# Patient Record
(demographics unavailable — no encounter records)

---

## 2024-11-04 NOTE — ASSESSMENT
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 Episodes of palpitations Increased COLLINS.  Plan: Fasting blood work ordered. Exercise stress test. Holter monitor for 7 days.  F/u after the tests.  Robin Galo MD

## 2024-11-04 NOTE — REVIEW OF SYSTEMS
[SOB] : shortness of breath [Dyspnea on exertion] : dyspnea during exertion [Chest Discomfort] : chest discomfort [Palpitations] : palpitations [Negative] : Gastrointestinal [Lower Ext Edema] : no extremity edema [Syncope] : no syncope [Dizziness] : dizziness [Anxiety] : anxiety

## 2024-11-04 NOTE — HISTORY OF PRESENT ILLNESS
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 Reports occasional episodes of left sided chest pain over the last 6 months. Reports feeling like she is being shocked in her chest. Reports palpitations. Reports worsening COLLINS with stairs. Pt currently living in [place] to help take care of sick uncle. No recent hospitalizations.   6/6/24

## 2024-12-29 NOTE — CARDIOLOGY SUMMARY
[de-identified] : (12/17/2024) ECG: sinus rhythm at 51 bpm, no significant ST/T abnormalities [de-identified] : (11/4/2024) Holter: NSVT x2: longest duration 8 beats; fastest 185 bpm; PVC 0.13%. pause 2.7 second. [de-identified] : (11/5/2024) EST: 1. The patient underwent stress testing using the standard Farshad protocol. _ The patient exercised for 9 min 0 sec. _ The test was stopped due to fatigue and shortness of breath. _ The peak heart rate was 138 bpm; 83 % of predicted maximal heart rate for this patient. _ The patient achieved 10.3 METS which is consistent with good exercise capacity. 2. Chest pain during test: No chest pain. 3. Did not achieve target heart rate. 4. Normal blood pressure response. 5. Arrhythmias: PACs, PVCs during recovery. 6. Baseline electrocardiogram: normal sinus rhythm at a rate of 78 bpm with no sigificant ST abnomalities. 7. Stress electrocardiogram: No significant ischemic ST segment changes. 8. The ECG is negative for ischemia at heart rate achieved. [de-identified] : (12/18/2024) TTE: 1. Left ventricular cavity is normal in size. Left ventricular systolic function is normal with an ejection fraction of 58 % by Zeng's method of disks. There are no regional wall motion abnormalities seen. 2. Normal left ventricular diastolic function. 3. Normal right ventricular cavity size, with normal wall thickness, and normal right ventricular systolic function. 4. Normal left and right atrial size. 5. 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 6. Prolapse of the anterior mitral leaflet. 7. Mild mitral regurgitation. 8. No pericardial effusion seen.

## 2024-12-29 NOTE — HISTORY OF PRESENT ILLNESS
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 NSVT  intermittent palpitations every few days, reported as moderate to severe intensity had syncope early December 2024, no preceding symptoms.  She has no chest pain, no shortness of breath, no dyspnea on exertion, no orthopnea, no PND. She denies dizziness, lightheadedness. She has no exertional symptoms. She presents for evaluation.

## 2024-12-29 NOTE — DISCUSSION/SUMMARY
[FreeTextEntry1] : Ms. Kim Miner is a pleasant 54-year-old woman with SVT s/p Ablation AVNRT (10/2010), Multiple Sclerosis, anxiety, PTSD, mitral valve prolapse. NSVT  Patient reports intermittent palpitations every few days, reported as moderate to severe intensity. She also had syncope early December 2024, no preceding symptoms.  I am not able to start BB due to resting bradycardia.  I recommend CMR to evaluate for Mitral Annular disjunction.   I recommend evaluation for TAURUS.   I recommend ILR to assess for arrhythmias as etiology of syncope. I recommend an ILR implant for this patient for long term detection of arrhythmias as a cause to her symptoms. I discussed an ILR implantation with the patient in great detail. I discussed benefits such as monitoring the heart rate and rhythm for a prolonged period of time which could identify causes of her symptoms and assist in establishing a diagnosis for future management and treatment. In addition, ILR implantation procedure as well as risks such as infection, bleeding and sensitivity to cardiac monitor material was discussed. Ample time was provided for questions/answers. Patient has expressed understanding and agreement to proceed with ILR implant. Patient will be notified by my  to confirm scheduling date . I discussed remote monitoring and follow up device interrogations as well.  I discussed with patient plan of care in great details. I answered all her questions to her satisfaction. Patient was pleased with the visit.  Patient will follow with me in 2 months time. Please do not hesitate to contact me at [phone] if you have any further questions regarding this patient care.  [EKG obtained to assist in diagnosis and management of assessed problem(s)] : EKG obtained to assist in diagnosis and management of assessed problem(s)

## 2025-02-17 NOTE — REASON FOR VISIT
[Arrhythmia/ECG Abnorrmalities] : arrhythmia/ECG abnormalities [FreeTextEntry3] : Dr. Robin Galo - Dr. Staci Garvin

## 2025-02-17 NOTE — DISCUSSION/SUMMARY
[FreeTextEntry1] : Ms. 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  I am not able to start BB due to resting bradycardia.  I reviewed result of CMR and educated patient on Mitral Annular disjunction.   I recommend evaluation for TAURUS.   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 Based on EHRA expert consensus statement on arrhythmic mitral valve prolapse and mitral annular disjunction complex published in Europ[employer] (2022) 24, 4769-3320, and endorsed by the Heart Rhythm Society, I recommend implant of dual chamber Defibrillator for the prevention of sudden cardiac death.  I will aim for dual chamber to allow atrial pacing and use of Betablockers to treat NSVT.  I am recommending a defibrillator implant for the primary prevention of sudden cardiac death. A thorough discussion was held with the patient concerning all aspects of ICD therapy. We reviewed the data supporting ICD therapy and how it applies individually. We discussed the risks, nature of procedure, and follow up care after device is implanted, including outcomes of ICD implantation and living with an ICD. We discussed management of ICD therapy throughout life, including deactivation of the ICD. Patient is in agreement with proceeding with the device implant. We discussed the risks of bleeding, hematoma, injury to vessels and heart, perforation, tamponade, pneumothorax, infection, lead dislodgment, device malfunction, and rare risks of stroke/heart attack/death. Patient expressed understanding of the discussion. After all questions were answered, it was a shared decision to proceed with ICD therapy. My  will contact patient with date and instruction prior to the procedure.  Dual, [facility] [employer], 3/6/2025.  Patient will follow with me in 4-6 weeks' time or earlier if symptoms develop worsen. Please do not hesitate to contact me at [phone] if you have any further questions regarding this patient care..

## 2025-02-17 NOTE — HISTORY OF PRESENT ILLNESS
[Home] : at home, [unfilled] , at the time of the visit. [Medical Office: ([facility])___] : at the medical office located in  [Telehealth (audio & video)] : This visit was provided via telehealth using real-time 2-way audio visual technology. 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 NSVT, unexplained syncope, mitral annular disjunction  intermittent palpitations every few days, reported as moderate to severe intensity had syncope early December 2024, no preceding symptoms, fell between cabinet and wall. injured her elbow.  2/11/2025: no recurrent syncope. CMR showed mitral annular disjunction. She has no chest pain, no shortness of breath, no dyspnea on exertion, no orthopnea, no PND. She denies dizziness, lightheadedness. She has no exertional symptoms. She presents for evaluation.

## 2025-02-17 NOTE — CARDIOLOGY SUMMARY
[de-identified] : (12/17/2024) ECG: sinus rhythm at 51 bpm, no significant ST/T abnormalities [de-identified] : (11/4/2024) Holter: NSVT x2: longest duration 8 beats; fastest 185 bpm; PVC 0.13%. pause 2.7 second. [de-identified] : (11/5/2024) EST: 1. The patient underwent stress testing using the standard Farshad protocol. _ The patient exercised for 9 min 0 sec. _ The test was stopped due to fatigue and shortness of breath. _ The peak heart rate was 138 bpm; 83 % of predicted maximal heart rate for this patient. _ The patient achieved 10.3 METS which is consistent with good exercise capacity. 2. Chest pain during test: No chest pain. 3. Did not achieve target heart rate. 4. Normal blood pressure response. 5. Arrhythmias: PACs, PVCs during recovery. 6. Baseline electrocardiogram: normal sinus rhythm at a rate of 78 bpm with no sigificant ST abnomalities. 7. Stress electrocardiogram: No significant ischemic ST segment changes. 8. The ECG is negative for ischemia at heart rate achieved. [de-identified] : (1/30/2025) CMR: 1. Normal left ventricular size. Normal left ventricular wall thickness. Normal left ventricular systolic function (LVEF 61%). No regional wall motion abnormalities. 2. All left ventricular walls are fully viable and without myocardial scar/infarction. 3. Normal right ventricular size. Normal right ventricular systolic function (RVEF 51%). 4. Normal left atrial size. Mild mitral regurgitation. Mitral valve prolapse (bi-leaflet involvement). Mitral annular disjunction (maximal diameter 3 mm as measured in the four chamber orientation). [de-identified] : (12/18/2024) TTE: 1. Left ventricular cavity is normal in size. Left ventricular systolic function is normal with an ejection fraction of 58 % by Zeng's method of disks. There are no regional wall motion abnormalities seen. 2. Normal left ventricular diastolic function. 3. Normal right ventricular cavity size, with normal wall thickness, and normal right ventricular systolic function. 4. Normal left and right atrial size. 5. 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 6. Prolapse of the anterior mitral leaflet. 7. Mild mitral regurgitation. 8. No pericardial effusion seen.

## 2025-02-24 NOTE — CARDIOLOGY SUMMARY
[de-identified] : (2/24/2025) ECG: sinus rhythm at 59 bpm, no significant ST/T abnormalities (12/17/2024) ECG: sinus rhythm at 51 bpm, no significant ST/T abnormalities [de-identified] : (1/17/2025) MCOT 30 days. No Ventricular Tachycardia - PVC <1% (11/4/2024) Holter: NSVT x2: longest duration 8 beats; fastest 185 bpm; PVC 0.13%. pause 2.7 second. [de-identified] : (11/5/2024) EST: 1. The patient underwent stress testing using the standard Farshad protocol. _ The patient exercised for 9 min 0 sec. _ The test was stopped due to fatigue and shortness of breath. _ The peak heart rate was 138 bpm; 83 % of predicted maximal heart rate for this patient. _ The patient achieved 10.3 METS which is consistent with good exercise capacity. 2. Chest pain during test: No chest pain. 3. Did not achieve target heart rate. 4. Normal blood pressure response. 5. Arrhythmias: PACs, PVCs during recovery. 6. Baseline electrocardiogram: normal sinus rhythm at a rate of 78 bpm with no sigificant ST abnomalities. 7. Stress electrocardiogram: No significant ischemic ST segment changes. 8. The ECG is negative for ischemia at heart rate achieved. [de-identified] : (12/18/2024) TTE: 1. Left ventricular cavity is normal in size. Left ventricular systolic function is normal with an ejection fraction of 58 % by Zeng's method of disks. There are no regional wall motion abnormalities seen. 2. Normal left ventricular diastolic function. 3. Normal right ventricular cavity size, with normal wall thickness, and normal right ventricular systolic function. 4. Normal left and right atrial size. 5. 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 6. Prolapse of the anterior mitral leaflet. 7. Mild mitral regurgitation. 8. No pericardial effusion seen. [de-identified] : (1/30/2025) CMR: 1. Normal left ventricular size. Normal left ventricular wall thickness. Normal left ventricular systolic function (LVEF 61%). No regional wall motion abnormalities. 2. All left ventricular walls are fully viable and without myocardial scar/infarction. 3. Normal right ventricular size. Normal right ventricular systolic function (RVEF 51%). 4. Normal left atrial size. Mild mitral regurgitation. Mitral valve prolapse (bi-leaflet involvement). Mitral annular disjunction (maximal diameter 3 mm as measured in the four chamber orientation).

## 2025-02-24 NOTE — DISCUSSION/SUMMARY
[FreeTextEntry1] : Ms. 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 She had 8 days Holter in Nov 2024 and 30 days MCOT in Jan 2025  I am not able to start BB due to resting bradycardia.  I reviewed result of CMR and educated patient on Mitral Annular disjunction.   I recommend evaluation for TAURUS.   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 We discussed defibrillator implant at length. Patient is not interested in defibrillator at this time. The next best option is loop recorder implant to monitor for ventricular arrhythmias as cause of syncope.  Based on EHRA expert consensus statement on arrhythmic mitral valve prolapse and mitral annular disjunction complex published in Europ[employer] (2022) 24, [id number], and endorsed by the Heart Rhythm Society, I recommend implant of Loop recorder.   I recommend an ILR implant for this patient for long term detection of arrhythmias as a cause to her symptoms. I discussed an ILR implantation with the patient in great detail. I discussed benefits such as monitoring the heart rate and rhythm for a prolonged period of time which could identify causes of her symptoms and assist in establishing a diagnosis for future management and treatment. In addition, ILR implantation procedure as well as risks such as infection, bleeding and sensitivity to cardiac monitor material was discussed. Ample time was provided for questions/answers. Patient has expressed understanding and agreement to proceed with ILR implant. Patient will be notified by my  to confirm scheduling date . I discussed remote monitoring and follow up device interrogations as well.  I discussed with patient plan of care in great details. I answered all her questions to her satisfaction. Patient was pleased with the visit.  Patient will follow with me in 2 months time. Please do not hesitate to contact me at [phone] if you have any further questions regarding this patient care.  [EKG obtained to assist in diagnosis and management of assessed problem(s)] : EKG obtained to assist in diagnosis and management of assessed problem(s)

## 2025-06-09 NOTE — HISTORY OF PRESENT ILLNESS
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 NSVT, unexplained syncope, mitral annular disjunction  intermittent palpitations every few days, reported as moderate to severe intensity had syncope early December 2024, no preceding symptoms, fell between cabinet and wall. injured her elbow.  2/11/2025: no recurrent syncope. CMR showed mitral annular disjunction.   2/24/2025: no symptoms. no recurrent syncope. She does not defibrillator implant. She has no chest pain, no shortness of breath, no dyspnea on exertion, no orthopnea, no PND. She denies dizziness, lightheadedness. She has no exertional symptoms. She presents for evaluation.  S/p Loop implant for unknown cause of syncope on 4/29/2025

## 2025-06-09 NOTE — DISCUSSION/SUMMARY
[FreeTextEntry1] : Ms. 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 She had 8 days Holter in Nov 2024 and 30 days MCOT in Jan 2025  I am not able to start BB due to resting bradycardia.  I reviewed result of CMR and educated patient on Mitral Annular disjunction.   I recommend evaluation for TAURUS.   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 We discussed defibrillator implant at length. Patient is not interested in defibrillator at this time. The next best option is loop recorder implant to monitor for ventricular arrhythmias as cause of syncope.  Based on EHRA expert consensus statement on arrhythmic mitral valve prolapse and mitral annular disjunction complex published in Europace (2022) 24, 7754-6123, and endorsed by the Heart Rhythm Society, I recommend implant of Loop recorder.   I recommend an ILR implant for this patient for long term detection of arrhythmias as a cause to her symptoms. I discussed an ILR implantation with the patient in great detail. I discussed benefits such as monitoring the heart rate and rhythm for a prolonged period of time which could identify causes of her symptoms and assist in establishing a diagnosis for future management and treatment. In addition, ILR implantation procedure as well as risks such as infection, bleeding and sensitivity to cardiac monitor material was discussed. Ample time was provided for questions/answers. Patient has expressed understanding and agreement to proceed with ILR implant.  ---------------------------------- 6/9/2025 Presents for  wound check and device interrogation # Wound check - incision site has healed  but with remnant suture that is sticking out and was irritating her skin. Taken out using suture removal kit. # Loop was interrogated - report she had some palpitations and chest pains while watching her grands baseball game. Nothing found on interrogation. Tachy rate decreased to 162 from 176. I discussed parameters. process of remote monitoring. Emphasized that loop can't detect heart attack. I also mention possible co- pay  She is not on any meds. #  Current cigarette smoker - she said is slowly cutting back from 1 PPD now to 5 sticks now. RTO in 9-12 months I have also advised the patient to go to the nearest emergency room if he experiences any chest pain, dyspnea, syncope, or has any other compelling symptoms.

## 2025-06-09 NOTE — CARDIOLOGY SUMMARY
[de-identified] : (2/24/2025) ECG: sinus rhythm at 59 bpm, no significant ST/T abnormalities (12/17/2024) ECG: sinus rhythm at 51 bpm, no significant ST/T abnormalities [de-identified] : (1/17/2025) MCOT 30 days. No Ventricular Tachycardia - PVC <1% (11/4/2024) Holter: NSVT x2: longest duration 8 beats; fastest 185 bpm; PVC 0.13%. pause 2.7 second. [de-identified] : (11/5/2024) EST: 1. The patient underwent stress testing using the standard Farshad protocol. _ The patient exercised for 9 min 0 sec. _ The test was stopped due to fatigue and shortness of breath. _ The peak heart rate was 138 bpm; 83 % of predicted maximal heart rate for this patient. _ The patient achieved 10.3 METS which is consistent with good exercise capacity. 2. Chest pain during test: No chest pain. 3. Did not achieve target heart rate. 4. Normal blood pressure response. 5. Arrhythmias: PACs, PVCs during recovery. 6. Baseline electrocardiogram: normal sinus rhythm at a rate of 78 bpm with no sigificant ST abnomalities. 7. Stress electrocardiogram: No significant ischemic ST segment changes. 8. The ECG is negative for ischemia at heart rate achieved. [de-identified] : (12/18/2024) TTE: 1. Left ventricular cavity is normal in size. Left ventricular systolic function is normal with an ejection fraction of 58 % by Zeng's method of disks. There are no regional wall motion abnormalities seen. 2. Normal left ventricular diastolic function. 3. Normal right ventricular cavity size, with normal wall thickness, and normal right ventricular systolic function. 4. Normal left and right atrial size. 5. 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 6. Prolapse of the anterior mitral leaflet. 7. Mild mitral regurgitation. 8. No pericardial effusion seen. [de-identified] : (1/30/2025) CMR: 1. Normal left ventricular size. Normal left ventricular wall thickness. Normal left ventricular systolic function (LVEF 61%). No regional wall motion abnormalities. 2. All left ventricular walls are fully viable and without myocardial scar/infarction. 3. Normal right ventricular size. Normal right ventricular systolic function (RVEF 51%). 4. Normal left atrial size. Mild mitral regurgitation. Mitral valve prolapse (bi-leaflet involvement). Mitral annular disjunction (maximal diameter 3 mm as measured in the four chamber orientation).

## 2025-06-09 NOTE — PHYSICAL EXAM
[Well Developed] : well developed [Well Nourished] : well nourished [No Acute Distress] : no acute distress [Normal] : normal conjunctiva [Normal Conjunctiva] : normal conjunctiva [Normal Venous Pressure] : normal venous pressure [No Carotid Bruit] : no carotid bruit [Normal S1, S2] : normal S1, S2 [No Rub] : no rub [No Murmur] : no murmur [No Gallop] : no gallop [Clear Lung Fields] : clear lung fields [Good Air Entry] : good air entry [No Respiratory Distress] : no respiratory distress  [Soft] : abdomen soft [Non Tender] : non-tender [No Masses/organomegaly] : no masses/organomegaly [Normal Bowel Sounds] : normal bowel sounds [Normal Gait] : normal gait [No Edema] : no edema [No Cyanosis] : no cyanosis [No Clubbing] : no clubbing [No Varicosities] : no varicosities [No Rash] : no rash [No Skin Lesions] : no skin lesions [Moves all extremities] : moves all extremities [No Focal Deficits] : no focal deficits [Normal Speech] : normal speech [Alert and Oriented] : alert and oriented [Normal memory] : normal memory